# Patient Record
Sex: FEMALE | Race: WHITE | Employment: UNEMPLOYED | URBAN - METROPOLITAN AREA
[De-identification: names, ages, dates, MRNs, and addresses within clinical notes are randomized per-mention and may not be internally consistent; named-entity substitution may affect disease eponyms.]

---
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
## 2022-05-24 ENCOUNTER — LAB ONLY (OUTPATIENT)
Dept: FAMILY MEDICINE CLINIC | Age: 22
End: 2022-05-24

## 2022-05-24 ENCOUNTER — VIRTUAL VISIT (OUTPATIENT)
Dept: FAMILY MEDICINE CLINIC | Age: 22
End: 2022-05-24
Payer: COMMERCIAL

## 2022-05-24 DIAGNOSIS — R53.81 MALAISE: ICD-10-CM

## 2022-05-24 DIAGNOSIS — J02.9 SORE THROAT: Primary | ICD-10-CM

## 2022-05-24 DIAGNOSIS — M79.10 MYALGIA: ICD-10-CM

## 2022-05-24 DIAGNOSIS — J02.9 SORE THROAT: ICD-10-CM

## 2022-05-24 LAB
S PYO AG THROAT QL: NEGATIVE
VALID INTERNAL CONTROL?: YES

## 2022-05-24 PROCEDURE — 87880 STREP A ASSAY W/OPTIC: CPT | Performed by: STUDENT IN AN ORGANIZED HEALTH CARE EDUCATION/TRAINING PROGRAM

## 2022-05-24 PROCEDURE — 99213 OFFICE O/P EST LOW 20 MIN: CPT | Performed by: STUDENT IN AN ORGANIZED HEALTH CARE EDUCATION/TRAINING PROGRAM

## 2022-05-24 NOTE — PROGRESS NOTES
Kareem Moulton  25 y.o. female  2000  [address]  [id number]   [address]:    Telemedicine Progress Note  Vazquez Kunz MD       Encounter Date and Time: May 24, 2022 at 2:00 PM    Consent:  She and/or the health care decision maker is aware that that she may receive a bill for this telephone service, depending on her insurance coverage, and has provided verbal consent to proceed: Yes    Chief Complaint   Patient presents with    Cold Symptoms     History of Present Illness   Kareem Moulton is a 25 y.o. female was evaluated by synchronous (real-time) audio-video technology from home, through the [employer] Patient Portal.    Cold symptoms:   - L tonsil exudate and sore throat that started today  -  Muscle aches, fatigue x1 day, subjective fever   - S/p covid vaccination      Review of Systems   Review of Systems   HENT: Positive for sore throat. Negative for congestion. Respiratory: Negative for shortness of breath. Gastrointestinal: Negative for abdominal pain. Vitals/Objective:     General: alert, cooperative, no distress   Mental  status: mental status: alert, oriented to person, place, and time, normal mood, behavior, speech, dress, motor activity, and thought processes   Resp: resp: normal effort and no respiratory distress   Neuro: neuro: no gross deficits   Skin: skin: no discoloration or lesions of concern on visible areas   Due to this being a TeleHealth evaluation, many elements of the physical examination are unable to be assessed. Assessment and Plan:       ICD-10-CM ICD-9-CM    1. Sore throat  J02.9 462 AMB POC RAPID STREP A      CULTURE, THROAT      AMB POC COVID-19 COV   2. Malaise  R53.81 780.79 AMB POC COVID-19 COV   3.  Myalgia  M79.10 729.1 AMB POC COVID-19 COV     Sore throat: Ddx includes strep throat (centor criteria 2-3 pts) vs viral infection (including covid)  - POC rapid testing for strep and covid  - Nursing visit scheduled for this afternoon for collection    Time spent in direct conversation with the patient to include medical condition(s) discussed, assessment and treatment plan:       We discussed the expected course, resolution and complications of the diagnosis(es) in detail. Medication risks, benefits, costs, interactions, and alternatives were discussed as indicated. I advised her to contact the office if her condition worsens, changes or fails to improve as anticipated. She expressed understanding with the diagnosis(es) and plan. Patient understands that this encounter was a temporary measure, and the importance of further follow up and examination was emphasized. Patient verbalized understanding. Patient informed to follow up: prn. or then 2 months for annual physical    Electronically Signed: Keiko Deshpande MD    Providers location when delivering service (clinic, hospital, home): home    CPT Codes 71272-81065 for Established Patients may apply to this Telehealth Visit. POS code: 18. Modifier GT      Pursuant to the emergency declaration under the [address] [place] [address], formerly [facility] waiver authority and the [employer] Act, this Virtual  Visit was conducted, with patient's consent, to reduce the patient's risk of exposure to COVID-19 and provide continuity of care for an established patient. Services were provided through a video synchronous discussion virtually to substitute for in-person clinic visit. History   Patients past medical, surgical and family histories were reviewed and updated. History reviewed. No pertinent past medical history. History reviewed. No pertinent surgical history. History reviewed. No pertinent family history.   Social History     Socioeconomic History    Marital status: SINGLE     Spouse name: Not on file    Number of children: Not on file    Years of education: Not on file    Highest education level: Not on file   Occupational History    Not on file   Tobacco Use    Smoking status: Never Smoker    Smokeless tobacco: Never Used   Vaping Use    Vaping Use: Never used   Substance and Sexual Activity    Alcohol use: Never    Drug use: Never    Sexual activity: Not Currently   Other Topics Concern    Not on file   Social History Narrative    Not on file     Social Determinants of Health     Financial Resource Strain:     Difficulty of Paying Living Expenses: Not on file   Food Insecurity:     Worried About Running Out of Food in the Last Year: Not on file    Mony of Food in the Last Year: Not on file   Transportation Needs:     Lack of Transportation (Medical): Not on file    Lack of Transportation (Non-Medical):  Not on file   Physical Activity:     Days of Exercise per Week: Not on file    Minutes of Exercise per Session: Not on file   Stress:     Feeling of Stress : Not on file   Social Connections:     Frequency of Communication with Friends and Family: Not on file    Frequency of Social Gatherings with Friends and Family: Not on file    Attends Sikh Services: Not on file    Active Member of [address] [place] [address] or Organizations: Not on file    Attends Club or Organization Meetings: Not on file    Marital Status: Not on file   Intimate Partner Violence:     Fear of Current or Ex-Partner: Not on file    Emotionally Abused: Not on file    Physically Abused: Not on file    Sexually Abused: Not on file   Housing Stability:     Unable to Pay for Housing in the Last Year: Not on file    Number of [place] in the Last Year: Not on file    Unstable Housing in the Last Year: Not on file     Patient Active Problem List   Diagnosis Code   (none) - all problems resolved or deleted          Current Medications/Allergies   Medications and Allergies reviewed:      No Known Allergies

## 2022-05-26 ENCOUNTER — TELEPHONE (OUTPATIENT)
Dept: FAMILY MEDICINE CLINIC | Age: 22
End: 2022-05-26

## 2022-05-26 DIAGNOSIS — J02.0 STREPTOCOCCAL PHARYNGITIS: Primary | ICD-10-CM

## 2022-05-26 LAB
SARS-COV-2, NAA 2 DAY TAT: NORMAL
SARS-COV-2, NAA: NOT DETECTED

## 2022-05-26 NOTE — PROGRESS NOTES
[address] Medicine Residency Attending Addendum:  Dr. Raven Neely MD,  the patient and I were not physically present during this encounter. The resident and I are concurrently monitoring the patient care through appropriate telecommunication technology. I discussed the findings, assessment and plan with the resident and agree with the resident's findings and plan as documented in the resident's note.       Hortencia Garcia MD

## 2022-05-26 NOTE — TELEPHONE ENCOUNTER
Pt has questions about fever has gotten worse - highest she saw was 103, throat is also worse - tonsil is white and gray and inflamed. She hasnt heard the results of her tests yet so she doesn't know how to proceed for at home care. She is able to get her fever down with medication.     John Crews: [phone]

## 2022-05-27 LAB
BACTERIA SPEC CULT: ABNORMAL
BACTERIA SPEC CULT: ABNORMAL
SERVICE CMNT-IMP: ABNORMAL

## 2022-05-27 RX ORDER — AMOXICILLIN 500 MG/1
500 CAPSULE ORAL 2 TIMES DAILY
Qty: 20 CAPSULE | Refills: 0 | Status: SHIPPED | OUTPATIENT
Start: 2022-05-27 | End: 2022-06-06

## 2022-05-27 NOTE — TELEPHONE ENCOUNTER
Speaking Berkley Pruitt, patients mother and emergency contact. Patient continues to have swollen tonsils (white streaking), muscle aches and fevers up to 102. * F. Has been on Advil and tylenol alternating. Did a home COVID test was negative. No sore throat. No stomach issues. No cough. Recent Results (from the past 72 hour(s))   NOVEL CORONAVIRUS (COVID-19)    Collection Time: 05/24/22  2:45 PM   Result Value Ref Range    SARS-CoV-2, FERMÍN Not Detected Not Detected   CULTURE, THROAT    Collection Time: 05/24/22  2:45 PM    Specimen: Throat swab   Result Value Ref Range    Special Requests: NO SPECIAL REQUESTS      Culture result: FEW STREPTOCOCCI, BETA HEMOLYTIC GROUP G (A)      Culture result: MODERATE NORMAL RESPIRATORY HELEN     SARS-COV-2, FERMÍN 2 DAY TAT    Collection Time: 05/24/22  2:45 PM   Result Value Ref Range    SARS-CoV-2, FERMÍN 2 DAY TAT Performed    AMB POC RAPID STREP A    Collection Time: 05/24/22  2:49 PM   Result Value Ref Range    VALID INTERNAL CONTROL POC Yes     Group A Strep Ag Negative Negative     Will treat for strep throat. Amoxicillin called into pharmacy. Discussed signs/sx that should prompt her to seek urgent medical care.
